# Patient Record
Sex: FEMALE | Race: WHITE | NOT HISPANIC OR LATINO | Employment: FULL TIME | URBAN - METROPOLITAN AREA
[De-identification: names, ages, dates, MRNs, and addresses within clinical notes are randomized per-mention and may not be internally consistent; named-entity substitution may affect disease eponyms.]

---

## 2021-08-23 ENCOUNTER — HISTORICAL (OUTPATIENT)
Dept: ADMINISTRATIVE | Facility: HOSPITAL | Age: 36
End: 2021-08-23

## 2021-08-23 LAB
ABS NEUT (OLG): 7.6 X10(3)/MCL (ref 2.1–9.2)
ALBUMIN SERPL-MCNC: 4.3 GM/DL (ref 3.4–5)
ALBUMIN/GLOB SERPL: 1.72 {RATIO} (ref 1.5–2.5)
ALP SERPL-CCNC: 60 UNIT/L (ref 38–126)
ALT SERPL-CCNC: 13 UNIT/L (ref 7–52)
AST SERPL-CCNC: 12 UNIT/L (ref 15–37)
BILIRUB SERPL-MCNC: 0.3 MG/DL (ref 0.2–1)
BILIRUBIN DIRECT+TOT PNL SERPL-MCNC: 0.1 MG/DL (ref 0–0.5)
BILIRUBIN DIRECT+TOT PNL SERPL-MCNC: 0.2 MG/DL
BUN SERPL-MCNC: 10 MG/DL (ref 7–18)
CALCIUM SERPL-MCNC: 9.2 MG/DL (ref 8.5–10)
CHLORIDE SERPL-SCNC: 106 MMOL/L (ref 98–107)
CHOLEST SERPL-MCNC: 165 MG/DL (ref 0–200)
CHOLEST/HDLC SERPL: 3.1 {RATIO}
CO2 SERPL-SCNC: 26 MMOL/L (ref 21–32)
CREAT SERPL-MCNC: 0.61 MG/DL (ref 0.6–1.3)
ERYTHROCYTE [DISTWIDTH] IN BLOOD BY AUTOMATED COUNT: 12.5 % (ref 11.5–17)
EST CREAT CLEARANCE SER (OHS): 172.03 ML/MIN
GLOBULIN SER-MCNC: 2.5 GM/DL (ref 1.2–3)
GLUCOSE SERPL-MCNC: 87 MG/DL (ref 74–106)
HCT VFR BLD AUTO: 42.5 % (ref 37–47)
HDLC SERPL-MCNC: 53 MG/DL (ref 35–60)
HGB BLD-MCNC: 13.9 GM/DL (ref 12–16)
LDLC SERPL CALC-MCNC: 81 MG/DL (ref 0–129)
LYMPHOCYTES # BLD AUTO: 1.9 X10(3)/MCL (ref 0.6–3.4)
LYMPHOCYTES NFR BLD AUTO: 19.6 % (ref 13–40)
MCH RBC QN AUTO: 29.4 PG (ref 27–31.2)
MCHC RBC AUTO-ENTMCNC: 33 GM/DL (ref 32–36)
MCV RBC AUTO: 90 FL (ref 80–94)
MONOCYTES # BLD AUTO: 0.4 X10(3)/MCL (ref 0.1–1.3)
MONOCYTES NFR BLD AUTO: 4.2 % (ref 0.1–24)
NEUTROPHILS NFR BLD AUTO: 76.2 % (ref 47–80)
PLATELET # BLD AUTO: 372 X10(3)/MCL (ref 130–400)
PMV BLD AUTO: 8.6 FL (ref 9.4–12.4)
POTASSIUM SERPL-SCNC: 4.2 MMOL/L (ref 3.5–5.1)
PROT SERPL-MCNC: 6.8 GM/DL (ref 6.4–8.2)
RBC # BLD AUTO: 4.73 X10(6)/MCL (ref 4.2–5.4)
SODIUM SERPL-SCNC: 140 MMOL/L (ref 136–145)
TRIGL SERPL-MCNC: 148 MG/DL (ref 30–150)
TSH SERPL-ACNC: 1.76 MIU/ML (ref 0.35–4.94)
VLDLC SERPL CALC-MCNC: 29.6 MG/DL
WBC # SPEC AUTO: 9.9 X10(3)/MCL (ref 4.5–11.5)

## 2022-04-07 ENCOUNTER — HISTORICAL (OUTPATIENT)
Dept: ADMINISTRATIVE | Facility: HOSPITAL | Age: 37
End: 2022-04-07

## 2022-04-23 VITALS
DIASTOLIC BLOOD PRESSURE: 88 MMHG | HEIGHT: 60 IN | BODY MASS INDEX: 37.35 KG/M2 | WEIGHT: 190.25 LBS | SYSTOLIC BLOOD PRESSURE: 130 MMHG | OXYGEN SATURATION: 97 %

## 2022-05-02 NOTE — HISTORICAL [facility]
This is a historical note converted from Paulo. Formatting and pictures may have been removed.  Please reference Paulo for original formatting and attached multimedia. Chief Complaint  CPX FASTING  History of Present Illness  36?year old female presents for wellness visit.  Blood Pressure -130/88  BMI - 37.35  Immunizations -? Last Tetanus greater than 10 years  Cervical Cancer Screening -?up to date, Sees Dr. Goetz  Diet - Low carb/low calorie diet  Exercise - Walking 3-4 times weekly 30 minutes  Allergic Rhinitis - Manageable with Zyrtec and Flonase.?  Review of Systems  Constitutional:?No weight loss, no fever, no fatigue, no chills, no night sweats,?no weakness  Eyes:?No blurred vision,?no redness,?no drainage,?no ocular?pain  HEENT:?No sore throat,?no ear pain, no sinus pressure, no nasal congestion, no rhinorrhea, no postnasal drip  Respiratory:?No cough, no wheezing, no sputum production, no shortness of breath  Cardiovascular:?No chest pain, no palpitations, no dyspnea on exertion,?no orthopnea  Gastrointestinal:?No nausea, no vomiting, no abdominal pain, no diarrhea,?no constipation, no melena,?no hematochezia  Genitourinary:?No dysuria, no hematuria, no frequency, no urgency, no incontinence, no discharge  Musculoskeletal:?No myalgias, no arthralgias, no weakness, no joint effusion, no edema  Integumentary:?No rashes, no hives, no itching, no lesions, no jaundice  Neurologic:?No headaches, no numbness, no tingling, no weakness, no dizziness  Psychiatric:?No anxiety, no irritability, no depression,?no suicidal ideations, no?homicidal ideations,?no delusions, no hallucinations  Endocrine:?No polyuria, no polydipsia, no polyphagia  Hematology:?No bruising, no lymphadenopathy,?no paleness  ?  Physical Exam  Vitals & Measurements  HR:?99(Peripheral)? BP:?130/88? SpO2:?97%?  HT:?152.00?cm? WT:?86.300?kg? BMI:?37.35?  General:?Well developed, Well-nourished, in No acute distress, A&O x 4  Eye:?PERRLA, EOMI,  Clear conjunctiva, Eyelids unremarkable  Ears:?Bilateral EAC clear?and?Bilateral TM clear  Nose:? Mucosa normal, No rhinorrhea, No lesions  O/P:??No?erythema,?No tonsillar hypertrophy,?No tonsillar exudates,?No cobblestoning  Neck:?Soft, Nontender, No thyromegaly, Full range of motion, No cervical lymphadenopathy, No JVD  Cardiovascular:?Regular rate and rhythm, No murmurs, No gallops, No rubs  Respiratory:?Clear to auscultation bilaterally, No wheezes, No rhonchi, No?crackles  Abdomen:? Soft, Nontender, No hepatosplenomegaly, Normal and equal bowel sounds, No masses, No rebound, No guarding  Musculoskeletal:? No tenderness, FROM, No joint abnormality, No clubbing, No cyanosis,No?edema  Neurologic:? No motor or sensory deficits, Reflexes 2+ throughout, CN II-XII grossly intact  Integumentary:?No rashes or skin lesions  ?  Assessment/Plan  1.?Wellness examination?Z00.00  ?Discussed the?importance of maintaining a balanced diet and regular exercise  CBC, CMP, FLP, TSH today  Ordered:  Preventative Health Care Est 18-39 years [id number] PC, Wellness examination, [facility] - [facility], 08/23/21 11:05:00 CDT  ?  2.?Encounter for immunization?Z23  ?Tdap today  ?  Referrals  Clinic Follow up, Order for future visit   Problem List/Past Medical History  Ongoing  Obesity(  Probable Diagnosis  )  Historical  No qualifying data  Procedure/Surgical History  Adenoidectomy, primary; younger than age 12 (1989)  Tonsillectomy (1989)   Medications  ethinyl estradiol-norgestimate 35 mcg-0.25 mg oral tablet, 1 tab(s), Oral, Daily  Flonase 50 mcg/inh nasal spray, Nasal, Daily  Zyrtec 10 mg oral tablet, 10 mg= 1 tab(s), Oral, Daily  Allergies  sulfa drugs  Social History  Abuse/Neglect  No, No, Yes, 08/23/2021  Alcohol  Current, 1-2 times per month, 08/23/2021  Employment/School  Employed, Work/School description:  - [employer] 08/23/2021  Exercise  Exercise duration: 30. Exercise frequency: 3-4  times/week. Exercise type: Walking., 08/23/2021  Substance Use  Never, 12/30/2020  Tobacco  Former smoker, quit more than 30 days ago, Cigarettes, No, 10 per day. 8 year(s). 18 Years (Age started). 26 Years (Age stopped)., 08/23/2021  Family History  Hypertension.: Mother, Father and Sister.  Osteoarthritis: Mother.  Suicide: Brother.  Sister: History is negative  Immunizations  Vaccine Date Status Comments   tetanus/diphtheria/pertussis, acel(Tdap) 08/23/2021 Given    COVID-19 mRNA, LNP-S, PF - Moderna 04/09/2021 Recorded    COVID-19 mRNA, LNP-S, PF - Moderna 03/05/2021 Recorded    influenza virus vaccine, inactivated 09/01/2020 Recorded    influenza virus vaccine, inactivated 09/16/2019 Recorded    tetanus/diphtheria/pertussis, acel(Tdap) - Not Given Vaccine Unavailable     influenza virus vaccine, inactivated 10/12/2018 Recorded    influenza virus vaccine, inactivated 09/22/2014 Recorded    influenza virus vaccine, inactivated 10/09/2009 Recorded    Health Maintenance  Health Maintenance  ???Pending?(in the next year)  ??? ??OverDue  ??? ? ? ?Cervical Cancer Screening due??07/15/13??and every 3??year(s)  ??? ? ? ?Alcohol Misuse Screening due??01/02/21??and every 1??year(s)  ??? ??Due?  ??? ? ? ?ADL Screening due??08/23/21??and every 1??year(s)  ??? ? ? ?Depression Screening due??08/23/21??Unknown Frequency  ??? ??Due In Future?  ??? ? ? ?Obesity Screening not due until??01/01/22??and every 1??year(s)  ???Satisfied?(in the past 1 year)  ??? ??Satisfied?  ??? ? ? ?Blood Pressure Screening on??08/23/21.??Satisfied by Jany Lockhart LPN  ??? ? ? ?Body Mass Index Check on??08/23/21.??Satisfied by Jany Lockhart LPN  ??? ? ? ?Influenza Vaccine on??09/01/20.??Satisfied by Jany Lockhart LPN  ??? ? ? ?Obesity Screening on??08/23/21.??Satisfied by Jany Lockhart LPN  ??? ? ? ?Tetanus Vaccine on??08/23/21.??Satisfied by Jany Lockhart LPN  ?

## 2022-06-12 ENCOUNTER — OFFICE VISIT (OUTPATIENT)
Dept: URGENT CARE | Facility: CLINIC | Age: 37
End: 2022-06-12
Payer: COMMERCIAL

## 2022-06-12 VITALS
HEIGHT: 60 IN | DIASTOLIC BLOOD PRESSURE: 86 MMHG | RESPIRATION RATE: 17 BRPM | HEART RATE: 91 BPM | BODY MASS INDEX: 36.12 KG/M2 | WEIGHT: 184 LBS | OXYGEN SATURATION: 100 % | SYSTOLIC BLOOD PRESSURE: 130 MMHG | TEMPERATURE: 98 F

## 2022-06-12 DIAGNOSIS — J01.90 ACUTE SINUSITIS, RECURRENCE NOT SPECIFIED, UNSPECIFIED LOCATION: Primary | ICD-10-CM

## 2022-06-12 PROCEDURE — 3008F PR BODY MASS INDEX (BMI) DOCUMENTED: ICD-10-PCS | Mod: CPTII,,, | Performed by: PHYSICIAN ASSISTANT

## 2022-06-12 PROCEDURE — 1160F PR REVIEW ALL MEDS BY PRESCRIBER/CLIN PHARMACIST DOCUMENTED: ICD-10-PCS | Mod: CPTII,,, | Performed by: PHYSICIAN ASSISTANT

## 2022-06-12 PROCEDURE — 1159F PR MEDICATION LIST DOCUMENTED IN MEDICAL RECORD: ICD-10-PCS | Mod: CPTII,,, | Performed by: PHYSICIAN ASSISTANT

## 2022-06-12 PROCEDURE — 3079F DIAST BP 80-89 MM HG: CPT | Mod: CPTII,,, | Performed by: PHYSICIAN ASSISTANT

## 2022-06-12 PROCEDURE — 96372 PR INJECTION,THERAP/PROPH/DIAG2ST, IM OR SUBCUT: ICD-10-PCS | Mod: ,,, | Performed by: PHYSICIAN ASSISTANT

## 2022-06-12 PROCEDURE — 96372 THER/PROPH/DIAG INJ SC/IM: CPT | Mod: ,,, | Performed by: PHYSICIAN ASSISTANT

## 2022-06-12 PROCEDURE — 3008F BODY MASS INDEX DOCD: CPT | Mod: CPTII,,, | Performed by: PHYSICIAN ASSISTANT

## 2022-06-12 PROCEDURE — 1160F RVW MEDS BY RX/DR IN RCRD: CPT | Mod: CPTII,,, | Performed by: PHYSICIAN ASSISTANT

## 2022-06-12 PROCEDURE — 99213 PR OFFICE/OUTPT VISIT, EST, LEVL III, 20-29 MIN: ICD-10-PCS | Mod: 25,,, | Performed by: PHYSICIAN ASSISTANT

## 2022-06-12 PROCEDURE — 3075F SYST BP GE 130 - 139MM HG: CPT | Mod: CPTII,,, | Performed by: PHYSICIAN ASSISTANT

## 2022-06-12 PROCEDURE — 1159F MED LIST DOCD IN RCRD: CPT | Mod: CPTII,,, | Performed by: PHYSICIAN ASSISTANT

## 2022-06-12 PROCEDURE — 99213 OFFICE O/P EST LOW 20 MIN: CPT | Mod: 25,,, | Performed by: PHYSICIAN ASSISTANT

## 2022-06-12 PROCEDURE — 3075F PR MOST RECENT SYSTOLIC BLOOD PRESS GE 130-139MM HG: ICD-10-PCS | Mod: CPTII,,, | Performed by: PHYSICIAN ASSISTANT

## 2022-06-12 PROCEDURE — 3079F PR MOST RECENT DIASTOLIC BLOOD PRESSURE 80-89 MM HG: ICD-10-PCS | Mod: CPTII,,, | Performed by: PHYSICIAN ASSISTANT

## 2022-06-12 RX ORDER — NORGESTIMATE AND ETHINYL ESTRADIOL 0.25-0.035
1 KIT ORAL DAILY
COMMUNITY
Start: 2022-06-06 | End: 2022-08-25

## 2022-06-12 RX ORDER — BETAMETHASONE SODIUM PHOSPHATE AND BETAMETHASONE ACETATE 3; 3 MG/ML; MG/ML
9 INJECTION, SUSPENSION INTRA-ARTICULAR; INTRALESIONAL; INTRAMUSCULAR; SOFT TISSUE
Status: COMPLETED | OUTPATIENT
Start: 2022-06-12 | End: 2022-06-12

## 2022-06-12 RX ORDER — FLUTICASONE PROPIONATE 50 MCG
1 SPRAY, SUSPENSION (ML) NASAL DAILY
COMMUNITY

## 2022-06-12 RX ORDER — AMOXICILLIN AND CLAVULANATE POTASSIUM 875; 125 MG/1; MG/1
1 TABLET, FILM COATED ORAL EVERY 12 HOURS
Qty: 20 TABLET | Refills: 0 | Status: SHIPPED | OUTPATIENT
Start: 2022-06-12 | End: 2022-06-22

## 2022-06-12 RX ADMIN — BETAMETHASONE SODIUM PHOSPHATE AND BETAMETHASONE ACETATE 9 MG: 3; 3 INJECTION, SUSPENSION INTRA-ARTICULAR; INTRALESIONAL; INTRAMUSCULAR; SOFT TISSUE at 08:06

## 2022-06-12 NOTE — PROGRESS NOTES
Subjective:       Patient ID: Mireya Summers is a 37 y.o. female.    Vitals:  height is 5' (1.524 m) and weight is 83.5 kg (184 lb). Her temperature is 98.1 °F (36.7 °C). Her blood pressure is 130/86 and her pulse is 91. Her respiration is 17 and oxygen saturation is 100%.     Chief Complaint: Sinus Problem    Sinus pressure and congestion x 4 days  Cough and runny nose since 6/1    Denies any fever neck stiffness rash shortness of breath or GI symptoms.  Patient is without relief from Flonase Allegra and Mucinex.    Sinus Problem      ROS    Objective:      Physical Exam   Constitutional: She is oriented to person, place, and time. She appears well-developed. She is cooperative.  Non-toxic appearance. She does not appear ill. No distress.   HENT:   Head: Normocephalic and atraumatic.   Ears:   Right Ear: Hearing, tympanic membrane, external ear and ear canal normal.   Left Ear: Hearing, tympanic membrane, external ear and ear canal normal.   Nose: Nose normal. No mucosal edema, rhinorrhea or nasal deformity. No epistaxis. Right sinus exhibits no maxillary sinus tenderness and no frontal sinus tenderness. Left sinus exhibits no maxillary sinus tenderness and no frontal sinus tenderness.   Mouth/Throat: Uvula is midline, oropharynx is clear and moist and mucous membranes are normal. No trismus in the jaw. Normal dentition. No uvula swelling. No oropharyngeal exudate, posterior oropharyngeal edema or posterior oropharyngeal erythema.   Eyes: Conjunctivae and lids are normal. No scleral icterus.   Neck: Trachea normal and phonation normal. Neck supple. No edema present. No erythema present. No neck rigidity present.   Cardiovascular: Normal rate, regular rhythm, normal heart sounds and normal pulses.   Pulmonary/Chest: Effort normal and breath sounds normal. No respiratory distress. She has no decreased breath sounds. She has no rhonchi.   Abdominal: Normal appearance.   Musculoskeletal: Normal range of motion.          General: No deformity. Normal range of motion.   Neurological: She is alert and oriented to person, place, and time. She exhibits normal muscle tone. Coordination normal.   Skin: Skin is warm, dry, intact, not diaphoretic and not pale.   Psychiatric: Her speech is normal and behavior is normal. Judgment and thought content normal.   Nursing note and vitals reviewed.         Previous History      Review of patient's allergies indicates:   Allergen Reactions    Sulfa (sulfonamide antibiotics)        Past Medical History:   Diagnosis Date    Known health problems: none      Current Outpatient Medications   Medication Instructions    amoxicillin-clavulanate 875-125mg (AUGMENTIN) 875-125 mg per tablet 1 tablet, Oral, Every 12 hours    ESTARYLLA 0.25-35 mg-mcg per tablet 1 tablet, Oral, Daily    fluticasone propionate (FLONASE) 50 mcg/actuation nasal spray 1 spray, Each Nostril, Daily     Past Surgical History:   Procedure Laterality Date    ADENOIDECTOMY      TONSILLECTOMY       Family History   Problem Relation Age of Onset    No Known Problems Mother     No Known Problems Father        Social History     Tobacco Use    Smoking status: Never Smoker    Smokeless tobacco: Never Used   Substance Use Topics    Alcohol use: Not Currently    Drug use: Never        Physical Exam      Vital Signs Reviewed   /86   Pulse 91   Temp 98.1 °F (36.7 °C)   Resp 17   Ht 5' (1.524 m)   Wt 83.5 kg (184 lb)   LMP 06/01/2022   SpO2 100%   BMI 35.94 kg/m²        Procedures    Procedures     Labs     Results for orders placed or performed in visit on 08/23/21   Comprehensive Metabolic Panel   Result Value Ref Range    Alanine Aminotransferase 13.0 7.0 - 52.0 unit/L    Albumin/Globulin Ratio 1.72 1.50 - 2.50    Aspartate Aminotransferase 12.0 (L) 15.0 - 37.0 unit/L    Alkaline Phosphatase 60.0 38.0 - 126.0 unit/L    Albumin Level 4.3 3.4 - 5.0 gm/dL    Bilirubin Indirect 0.2 mg/dL    Blood Urea Nitrogen 10.0 7.0 -  18.0 mg/dL    Bilirubin Total 0.3 0.2 - 1.0 mg/dL    Bilirubin Direct 0.1 0.0 - 0.5 mg/dL    Calcium Level Total 9.2 8.5 - 10.0 mg/dL    Carbon Dioxide 26.0 21.0 - 32.0 mmol/L    Chloride 106.0 98.0 - 107.0 mmol/L    Creatinine 0.61 0.60 - 1.30 mg/dL    Glucose Level 87.0 74.0 - 106.0 mg/dL    Globulin 2.5 1.2 - 3.0 gm/dL    Sodium Level 140.0 136.0 - 145.0 mmol/L    Potassium Level 4.2 3.5 - 5.1 mmol/L    Protein Total 6.8 6.4 - 8.2 gm/dL   Lipid Panel   Result Value Ref Range    Cholesterol/HDL Ratio 3.1     Cholesterol Total 165.0 0.0 - 200.0 mg/dL    HDL Cholesterol 53.0 35.0 - 60.0 mg/dL    LDL Cholesterol 81.0 0.0 - 129.0 mg/dL    Triglyceride 148.0 30.0 - 150.0 mg/dL    Very Low Density Lipoprotein 29.6    TSH   Result Value Ref Range    Thyroid Stimulating Hormone 1.763 0.350 - 4.940 mIU/mL   GFR, Estimated   Result Value Ref Range    Estimated GFR-African American >60 mL/min/1.73 m2    Estimated GFR-Non African American >60 mL/min/1.73 m2   Differential   Result Value Ref Range    Mono # 0.4 0.1 - 1.3 x10(3)/mcL    Lymph # 1.9 0.6 - 3.4 x10(3)/mcL    Mono % 4.2 0.1 - 24.0 %    Neut % 76.2 47.0 - 80.0 %    Lymph % 19.6 13.0 - 40.0 %   CBC Auto Differential   Result Value Ref Range    Abs Neut 7.6 2.1 - 9.2 x10(3)/mcL    MCV 90 80 - 94 fL    MCH 29.4 27.0 - 31.2 pg    MCHC 33 32 - 36 gm/dL    MPV 8.6 (L) 9.4 - 12.4 fL    Hgb 13.9 12.0 - 16.0 gm/dL    Hct 42.5 37.0 - 47.0 %    Platelet 372 130 - 400 x10(3)/mcL    WBC 9.9 4.5 - 11.5 x10(3)/mcL    RDW 12.5 11.5 - 17.0 %    RBC 4.73 4.20 - 5.40 x10(6)/mcL         Assessment:       1. Acute sinusitis, recurrence not specified, unspecified location          Plan:         Acute sinusitis, recurrence not specified, unspecified location  -     betamethasone acetate-betamethasone sodium phosphate injection 9 mg  -     amoxicillin-clavulanate 875-125mg (AUGMENTIN) 875-125 mg per tablet; Take 1 tablet by mouth every 12 (twelve) hours. for 10 days  Dispense: 20 tablet;  Refill: 0    Drink plenty of fluids    Get plenty of rest.    Follow-up with your primary care doctor      Go to emergency department with any significant change or worsening symptoms.    Tylenol or Motrin as needed for fever.

## 2022-08-25 PROBLEM — J30.9 ALLERGIC RHINITIS: Status: ACTIVE | Noted: 2022-08-25

## 2022-08-25 PROBLEM — E66.9 OBESITY: Status: ACTIVE | Noted: 2022-08-25

## 2022-08-25 PROBLEM — J01.90 ACUTE SINUSITIS: Status: RESOLVED | Noted: 2022-06-12 | Resolved: 2022-08-25

## 2023-02-03 ENCOUNTER — OFFICE VISIT (OUTPATIENT)
Dept: URGENT CARE | Facility: CLINIC | Age: 38
End: 2023-02-03
Payer: COMMERCIAL

## 2023-02-03 VITALS
OXYGEN SATURATION: 100 % | SYSTOLIC BLOOD PRESSURE: 134 MMHG | BODY MASS INDEX: 35.34 KG/M2 | TEMPERATURE: 98 F | HEART RATE: 107 BPM | RESPIRATION RATE: 17 BRPM | DIASTOLIC BLOOD PRESSURE: 82 MMHG | HEIGHT: 60 IN | WEIGHT: 180 LBS

## 2023-02-03 DIAGNOSIS — J02.9 SORE THROAT: ICD-10-CM

## 2023-02-03 DIAGNOSIS — R05.9 COUGH, UNSPECIFIED TYPE: ICD-10-CM

## 2023-02-03 DIAGNOSIS — J02.0 STREP PHARYNGITIS: Primary | ICD-10-CM

## 2023-02-03 LAB
CTP QC/QA: YES
MOLECULAR STREP A: POSITIVE
POC MOLECULAR INFLUENZA A AGN: NEGATIVE
POC MOLECULAR INFLUENZA B AGN: NEGATIVE
SARS-COV-2 RDRP RESP QL NAA+PROBE: NEGATIVE

## 2023-02-03 PROCEDURE — 87635: ICD-10-PCS | Mod: QW,,,

## 2023-02-03 PROCEDURE — 87635 SARS-COV-2 COVID-19 AMP PRB: CPT | Mod: QW,,,

## 2023-02-03 PROCEDURE — 99214 PR OFFICE/OUTPT VISIT, EST, LEVL IV, 30-39 MIN: ICD-10-PCS | Mod: ,,,

## 2023-02-03 PROCEDURE — 87651 STREP A DNA AMP PROBE: CPT | Mod: QW,,,

## 2023-02-03 PROCEDURE — 3008F PR BODY MASS INDEX (BMI) DOCUMENTED: ICD-10-PCS | Mod: CPTII,,,

## 2023-02-03 PROCEDURE — 1160F PR REVIEW ALL MEDS BY PRESCRIBER/CLIN PHARMACIST DOCUMENTED: ICD-10-PCS | Mod: CPTII,,,

## 2023-02-03 PROCEDURE — 1160F RVW MEDS BY RX/DR IN RCRD: CPT | Mod: CPTII,,,

## 2023-02-03 PROCEDURE — 1159F MED LIST DOCD IN RCRD: CPT | Mod: CPTII,,,

## 2023-02-03 PROCEDURE — 87651 POCT STREP A MOLECULAR: ICD-10-PCS | Mod: QW,,,

## 2023-02-03 PROCEDURE — 3008F BODY MASS INDEX DOCD: CPT | Mod: CPTII,,,

## 2023-02-03 PROCEDURE — 87502 POCT INFLUENZA A/B MOLECULAR: ICD-10-PCS | Mod: QW,,,

## 2023-02-03 PROCEDURE — 87502 INFLUENZA DNA AMP PROBE: CPT | Mod: QW,,,

## 2023-02-03 PROCEDURE — 3079F DIAST BP 80-89 MM HG: CPT | Mod: CPTII,,,

## 2023-02-03 PROCEDURE — 3075F PR MOST RECENT SYSTOLIC BLOOD PRESS GE 130-139MM HG: ICD-10-PCS | Mod: CPTII,,,

## 2023-02-03 PROCEDURE — 3075F SYST BP GE 130 - 139MM HG: CPT | Mod: CPTII,,,

## 2023-02-03 PROCEDURE — 1159F PR MEDICATION LIST DOCUMENTED IN MEDICAL RECORD: ICD-10-PCS | Mod: CPTII,,,

## 2023-02-03 PROCEDURE — 99214 OFFICE O/P EST MOD 30 MIN: CPT | Mod: ,,,

## 2023-02-03 PROCEDURE — 3079F PR MOST RECENT DIASTOLIC BLOOD PRESSURE 80-89 MM HG: ICD-10-PCS | Mod: CPTII,,,

## 2023-02-03 RX ORDER — PROMETHAZINE HYDROCHLORIDE AND DEXTROMETHORPHAN HYDROBROMIDE 6.25; 15 MG/5ML; MG/5ML
5 SYRUP ORAL EVERY 6 HOURS PRN
Qty: 180 ML | Refills: 0 | Status: SHIPPED | OUTPATIENT
Start: 2023-02-03 | End: 2023-02-13

## 2023-02-03 RX ORDER — AMOXICILLIN 500 MG/1
500 TABLET, FILM COATED ORAL EVERY 12 HOURS
Qty: 20 TABLET | Refills: 0 | Status: SHIPPED | OUTPATIENT
Start: 2023-02-03 | End: 2023-02-13

## 2023-02-03 NOTE — PROGRESS NOTES
Subjective:       Patient ID: Mireya Summers is a 37 y.o. female.    Vitals:  height is 5' (1.524 m) and weight is 81.6 kg (180 lb). Her temperature is 98 °F (36.7 °C). Her blood pressure is 134/82 and her pulse is 107. Her respiration is 17 and oxygen saturation is 100%.     Chief Complaint: Sore Throat (Sore throat, headache, sinus drip, congestion began yesterday. Advil and Mucinex taken at 7am)    Patient is a 37-year-old female that presents complaining of sore throat, sinus congestion, sinus pressure headache, postnasal drip that is causing nausea times 2-3 days.  States she has taken Advil Mucinex with no improvement. Patient denies any SOB, CP, rash, n/v/d, or neck stiffness.      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    Sore Throat   Associated symptoms include congestion.     HENT:  Positive for congestion, postnasal drip, sinus pressure and sore throat.      Objective:      Physical Exam   Constitutional: She is oriented to person, place, and time.  Non-toxic appearance. She does not appear ill.   HENT:   Ears:   Right Ear: Tympanic membrane, external ear and ear canal normal.   Left Ear: Tympanic membrane, external ear and ear canal normal.   Nose: Rhinorrhea present. Right sinus exhibits maxillary sinus tenderness. Left sinus exhibits maxillary sinus tenderness.   Mouth/Throat: Posterior oropharyngeal erythema present. Tonsils are 3+ on the right. Tonsils are 3+ on the left. No tonsillar exudate.   Clear PND noted      Comments: Clear PND noted  Pulmonary/Chest: Effort normal and breath sounds normal.   Abdominal: Normal appearance and bowel sounds are normal. Soft. There is no abdominal tenderness.   Musculoskeletal: Normal range of motion.         General: Normal range of motion.   Neurological: She is alert and oriented to person, place, and time.   Skin: Skin is warm and dry. Capillary refill takes less than 2 seconds.   Psychiatric: Her behavior is normal. Mood  normal.       Assessment:       1. Strep pharyngitis    2. Sore throat    3. Cough, unspecified type             Previous History      Review of patient's allergies indicates:   Allergen Reactions    Sulfa (sulfonamide antibiotics)        Past Medical History:   Diagnosis Date    Allergic rhinitis 2022    Known health problems: none      Current Outpatient Medications   Medication Instructions    amoxicillin (AMOXIL) 500 mg, Oral, Every 12 hours    cetirizine (ZYRTEC) 10 mg, Oral, Daily    fluticasone propionate (FLONASE) 50 mcg/actuation nasal spray 1 spray, Each Nostril, Daily    norgestimate-ethinyl estradioL (ORTHO-CYCLEN) 0.25-35 mg-mcg per tablet Oral    promethazine-dextromethorphan (PROMETHAZINE-DM) 6.25-15 mg/5 mL Syrp 5 mLs, Oral, Every 6 hours PRN     Past Surgical History:   Procedure Laterality Date    ADENOIDECTOMY      TONSILLECTOMY       Family History   Problem Relation Age of Onset    Hypertension Mother         High blood pressure controlled with meds    Osteoarthritis Mother     Osteoporosis Mother     Hypertension Father         High blood pressure controlled with meds    Hypertension Sister     No Known Problems Sister     Suicide Brother     Lung cancer Maternal Grandmother         Lung cancer  at age 84    Stomach cancer Maternal Grandfather         Stomach Cancer  at age 78       Social History     Tobacco Use    Smoking status: Former     Packs/day: 0.50     Years: 5.00     Pack years: 2.50     Types: Cigarettes     Start date: 2003     Quit date: 1/3/2011     Years since quittin.0    Smokeless tobacco: Never   Substance Use Topics    Alcohol use: Not Currently     Comment: Only drink socially two/three times a year    Drug use: Never        Physical Exam      Vital Signs Reviewed   /82   Pulse 107   Temp 98 °F (36.7 °C)   Resp 17   Ht 5' (1.524 m)   Wt 81.6 kg (180 lb)   LMP 2023   SpO2 100%   BMI 35.15 kg/m²        Procedures    Procedures      Labs     Results for orders placed or performed in visit on 02/03/23   POCT Influenza A/B Molecular   Result Value Ref Range    POC Molecular Influenza A Ag Negative Negative, Not Reported    POC Molecular Influenza B Ag Negative Negative, Not Reported     Acceptable Yes    POCT Strep A, Molecular   Result Value Ref Range    Molecular Strep A, POC Positive (A) Negative     Acceptable Yes    POCT COVID-19 Rapid Screening   Result Value Ref Range    POC Rapid COVID Negative Negative     Acceptable Yes       Plan:         Strep pharyngitis  -     amoxicillin (AMOXIL) 500 MG Tab; Take 1 tablet (500 mg total) by mouth every 12 (twelve) hours. for 10 days  Dispense: 20 tablet; Refill: 0    Sore throat  -     POCT Influenza A/B Molecular  -     POCT Strep A, Molecular  -     POCT COVID-19 Rapid Screening    Cough, unspecified type  -     promethazine-dextromethorphan (PROMETHAZINE-DM) 6.25-15 mg/5 mL Syrp; Take 5 mLs by mouth every 6 (six) hours as needed (cough).  Dispense: 180 mL; Refill: 0

## 2023-08-30 PROBLEM — L74.510 AXILLARY HYPERHIDROSIS: Status: ACTIVE | Noted: 2023-08-30

## 2023-09-01 ENCOUNTER — OFFICE VISIT (OUTPATIENT)
Dept: URGENT CARE | Facility: CLINIC | Age: 38
End: 2023-09-01
Payer: COMMERCIAL

## 2023-09-01 VITALS
DIASTOLIC BLOOD PRESSURE: 85 MMHG | TEMPERATURE: 99 F | WEIGHT: 184 LBS | BODY MASS INDEX: 36.12 KG/M2 | RESPIRATION RATE: 18 BRPM | HEIGHT: 60 IN | HEART RATE: 101 BPM | SYSTOLIC BLOOD PRESSURE: 130 MMHG | OXYGEN SATURATION: 97 %

## 2023-09-01 DIAGNOSIS — J02.9 ACUTE PHARYNGITIS, UNSPECIFIED ETIOLOGY: Primary | ICD-10-CM

## 2023-09-01 DIAGNOSIS — J02.9 SORE THROAT: ICD-10-CM

## 2023-09-01 LAB
CTP QC/QA: YES
CTP QC/QA: YES
MOLECULAR STREP A: NEGATIVE
SARS-COV-2 RDRP RESP QL NAA+PROBE: NEGATIVE

## 2023-09-01 PROCEDURE — 96372 THER/PROPH/DIAG INJ SC/IM: CPT | Mod: ,,, | Performed by: PHYSICIAN ASSISTANT

## 2023-09-01 PROCEDURE — 99213 OFFICE O/P EST LOW 20 MIN: CPT | Mod: 25,,, | Performed by: PHYSICIAN ASSISTANT

## 2023-09-01 PROCEDURE — 87635: ICD-10-PCS | Mod: QW,,, | Performed by: PHYSICIAN ASSISTANT

## 2023-09-01 PROCEDURE — 87651 POCT STREP A MOLECULAR: ICD-10-PCS | Mod: QW,,, | Performed by: PHYSICIAN ASSISTANT

## 2023-09-01 PROCEDURE — 99213 PR OFFICE/OUTPT VISIT, EST, LEVL III, 20-29 MIN: ICD-10-PCS | Mod: 25,,, | Performed by: PHYSICIAN ASSISTANT

## 2023-09-01 PROCEDURE — 87651 STREP A DNA AMP PROBE: CPT | Mod: QW,,, | Performed by: PHYSICIAN ASSISTANT

## 2023-09-01 PROCEDURE — 87635 SARS-COV-2 COVID-19 AMP PRB: CPT | Mod: QW,,, | Performed by: PHYSICIAN ASSISTANT

## 2023-09-01 PROCEDURE — 96372 PR INJECTION,THERAP/PROPH/DIAG2ST, IM OR SUBCUT: ICD-10-PCS | Mod: ,,, | Performed by: PHYSICIAN ASSISTANT

## 2023-09-01 RX ORDER — BETAMETHASONE SODIUM PHOSPHATE AND BETAMETHASONE ACETATE 3; 3 MG/ML; MG/ML
9 INJECTION, SUSPENSION INTRA-ARTICULAR; INTRALESIONAL; INTRAMUSCULAR; SOFT TISSUE
Status: COMPLETED | OUTPATIENT
Start: 2023-09-01 | End: 2023-09-01

## 2023-09-01 RX ADMIN — BETAMETHASONE SODIUM PHOSPHATE AND BETAMETHASONE ACETATE 9 MG: 3; 3 INJECTION, SUSPENSION INTRA-ARTICULAR; INTRALESIONAL; INTRAMUSCULAR; SOFT TISSUE at 06:09

## 2023-09-01 NOTE — PROGRESS NOTES
Subjective:      Patient ID: Mireya Summers is a 38 y.o. female.    Vitals:  height is 5' (1.524 m) and weight is 83.5 kg (184 lb). Her temperature is 98.5 °F (36.9 °C). Her blood pressure is 130/85 and her pulse is 101. Her respiration is 18 and oxygen saturation is 97%.     Chief Complaint: Sore Throat     Patient is a 38 y.o. female who presents to urgent care with complaints of  sore throat and headache  x3 days.  Denies any fever shortness of breath or GI symptoms.    ROS   Objective:     Physical Exam   Constitutional: She is oriented to person, place, and time. She appears well-developed. She is cooperative.  Non-toxic appearance. She does not appear ill. No distress.   HENT:   Head: Normocephalic and atraumatic.   Ears:   Right Ear: Hearing, tympanic membrane, external ear and ear canal normal.   Left Ear: Hearing, tympanic membrane, external ear and ear canal normal.   Nose: Nose normal. No nasal deformity. No epistaxis.   Mouth/Throat: Uvula is midline, oropharynx is clear and moist and mucous membranes are normal. No trismus in the jaw. Normal dentition. No uvula swelling. No oropharyngeal exudate, posterior oropharyngeal edema or posterior oropharyngeal erythema.   Eyes: Conjunctivae and lids are normal. No scleral icterus.   Neck: Trachea normal and phonation normal. Neck supple. No edema present. No erythema present. No neck rigidity present.   Cardiovascular: Normal rate, regular rhythm, normal heart sounds and normal pulses.   Pulmonary/Chest: Effort normal and breath sounds normal. No respiratory distress. She has no decreased breath sounds. She has no rhonchi.   Abdominal: Normal appearance.   Musculoskeletal: Normal range of motion.         General: No deformity. Normal range of motion.   Lymphadenopathy:     She has no cervical adenopathy.   Neurological: She is alert and oriented to person, place, and time. She exhibits normal muscle tone. Coordination normal.   Skin: Skin is warm, dry, intact,  not diaphoretic and not pale.   Psychiatric: Her speech is normal and behavior is normal. Judgment and thought content normal.   Nursing note and vitals reviewed.         Previous History      Review of patient's allergies indicates:   Allergen Reactions    Sulfa (sulfonamide antibiotics)        Past Medical History:   Diagnosis Date    Allergic rhinitis 2022    Known health problems: none      Current Outpatient Medications   Medication Instructions    aluminum chloride (DRYSOL) 20 % external solution Topical (Top), Nightly    fluticasone propionate (FLONASE) 50 mcg/actuation nasal spray 1 spray, Each Nostril, Daily    loratadine (CLARITIN) 10 mg, Oral, Daily    norgestimate-ethinyl estradioL (ORTHO-CYCLEN) 0.25-35 mg-mcg per tablet Oral     Past Surgical History:   Procedure Laterality Date    ADENOIDECTOMY N/A     TONSILLECTOMY N/A      Family History   Problem Relation Age of Onset    Hypertension Mother         High blood pressure controlled with meds    Osteoarthritis Mother     Osteoporosis Mother     Hypertension Father         High blood pressure controlled with meds    Hypertension Sister     No Known Problems Sister     Suicide Brother     Lung cancer Maternal Grandmother         Lung cancer  at age 84    Stomach cancer Maternal Grandfather         Stomach Cancer  at age 78       Social History     Tobacco Use    Smoking status: Former     Current packs/day: 0.00     Average packs/day: 0.5 packs/day for 7.5 years (3.8 ttl pk-yrs)     Types: Cigarettes     Start date: 2003     Quit date: 1/3/2011     Years since quittin.6    Smokeless tobacco: Never   Substance Use Topics    Alcohol use: Not Currently     Comment: Only drink socially two/three times a year    Drug use: Never        Physical Exam      Vital Signs Reviewed   /85   Pulse 101   Temp 98.5 °F (36.9 °C)   Resp 18   Ht 5' (1.524 m)   Wt 83.5 kg (184 lb)   LMP 2023   SpO2 97%   BMI 35.94 kg/m²         Procedures    Procedures     Labs     Results for orders placed or performed in visit on 09/01/23   POCT COVID-19 Rapid Screening   Result Value Ref Range    POC Rapid COVID Negative Negative     Acceptable Yes    POCT Strep A, Molecular   Result Value Ref Range    Molecular Strep A, POC Negative Negative     Acceptable Yes      Assessment:     1. Acute pharyngitis, unspecified etiology    2. Sore throat        Plan:       Acute pharyngitis, unspecified etiology    Sore throat  -     POCT COVID-19 Rapid Screening  -     POCT Strep A, Molecular    Other orders  -     betamethasone acetate-betamethasone sodium phosphate injection 9 mg    Drink plenty of fluids.     Get plenty of rest.     Tylenol or Motrin as needed.     Go to the ER with any significant change or worsening of symptoms.     Follow up with your primary care doctor.

## 2024-03-12 ENCOUNTER — OFFICE VISIT (OUTPATIENT)
Dept: URGENT CARE | Facility: CLINIC | Age: 39
End: 2024-03-12
Payer: COMMERCIAL

## 2024-03-12 VITALS
BODY MASS INDEX: 34.16 KG/M2 | HEIGHT: 60 IN | SYSTOLIC BLOOD PRESSURE: 135 MMHG | RESPIRATION RATE: 18 BRPM | TEMPERATURE: 98 F | OXYGEN SATURATION: 99 % | HEART RATE: 82 BPM | WEIGHT: 174 LBS | DIASTOLIC BLOOD PRESSURE: 92 MMHG

## 2024-03-12 DIAGNOSIS — J02.9 ACUTE PHARYNGITIS, UNSPECIFIED ETIOLOGY: Primary | ICD-10-CM

## 2024-03-12 DIAGNOSIS — J02.9 SORE THROAT: ICD-10-CM

## 2024-03-12 LAB
CTP QC/QA: YES
MOLECULAR STREP A: NEGATIVE

## 2024-03-12 PROCEDURE — 96372 THER/PROPH/DIAG INJ SC/IM: CPT | Mod: ,,, | Performed by: PHYSICIAN ASSISTANT

## 2024-03-12 PROCEDURE — 87651 STREP A DNA AMP PROBE: CPT | Mod: QW,,, | Performed by: PHYSICIAN ASSISTANT

## 2024-03-12 PROCEDURE — 99213 OFFICE O/P EST LOW 20 MIN: CPT | Mod: 25,,, | Performed by: PHYSICIAN ASSISTANT

## 2024-03-12 RX ORDER — BETAMETHASONE SODIUM PHOSPHATE AND BETAMETHASONE ACETATE 3; 3 MG/ML; MG/ML
9 INJECTION, SUSPENSION INTRA-ARTICULAR; INTRALESIONAL; INTRAMUSCULAR; SOFT TISSUE
Status: COMPLETED | OUTPATIENT
Start: 2024-03-12 | End: 2024-03-12

## 2024-03-12 RX ADMIN — BETAMETHASONE SODIUM PHOSPHATE AND BETAMETHASONE ACETATE 9 MG: 3; 3 INJECTION, SUSPENSION INTRA-ARTICULAR; INTRALESIONAL; INTRAMUSCULAR; SOFT TISSUE at 12:03

## 2024-03-12 NOTE — PROGRESS NOTES
Subjective:      Patient ID: Mireya Summers is a 39 y.o. female.    Vitals:  height is 5' (1.524 m) and weight is 78.9 kg (174 lb). Her temperature is 97.9 °F (36.6 °C). Her blood pressure is 135/92 (abnormal) and her pulse is 82. Her respiration is 18 and oxygen saturation is 99%.     Chief Complaint: Sore Throat        Patient is a 39-year-old female complains of a 1 day history of fatigue malaise nausea and sore throat.  Denies fever shortness of breath cough or GI symptoms.      ROS   Objective:     Physical Exam   Constitutional: She is oriented to person, place, and time. She appears well-developed. She is cooperative.  Non-toxic appearance. She does not appear ill. No distress.   HENT:   Head: Normocephalic and atraumatic.   Ears:   Right Ear: Hearing, tympanic membrane, external ear and ear canal normal.   Left Ear: Hearing, tympanic membrane, external ear and ear canal normal.   Nose: Nose normal. No nasal deformity. No epistaxis.   Mouth/Throat: Uvula is midline, oropharynx is clear and moist and mucous membranes are normal. No trismus in the jaw. Normal dentition. No uvula swelling. No oropharyngeal exudate, posterior oropharyngeal edema or posterior oropharyngeal erythema.   Eyes: Conjunctivae and lids are normal. No scleral icterus.   Neck: Trachea normal and phonation normal. Neck supple. No edema present. No erythema present. No neck rigidity present.   Cardiovascular: Normal rate, regular rhythm, normal heart sounds and normal pulses.   Pulmonary/Chest: Effort normal and breath sounds normal. No respiratory distress. She has no decreased breath sounds. She has no rhonchi.   Abdominal: Normal appearance.   Musculoskeletal: Normal range of motion.         General: No deformity. Normal range of motion.   Lymphadenopathy:     She has cervical adenopathy.   Neurological: She is alert and oriented to person, place, and time. She exhibits normal muscle tone. Coordination normal.   Skin: Skin is warm, dry,  intact, not diaphoretic and not pale.   Psychiatric: Her speech is normal and behavior is normal. Judgment and thought content normal.   Nursing note and vitals reviewed.    Strep negative       Previous History      Review of patient's allergies indicates:   Allergen Reactions    Sulfa (sulfonamide antibiotics)        Past Medical History:   Diagnosis Date    Allergic rhinitis 2022    Known health problems: none      Current Outpatient Medications   Medication Instructions    aluminum chloride (DRYSOL) 20 % external solution Topical (Top), Nightly    fluticasone propionate (FLONASE) 50 mcg/actuation nasal spray 1 spray, Each Nostril, Daily    loratadine (CLARITIN) 10 mg, Oral, Daily    norgestimate-ethinyl estradioL (ORTHO-CYCLEN) 0.25-35 mg-mcg per tablet Oral     Past Surgical History:   Procedure Laterality Date    ADENOIDECTOMY N/A     TONSILLECTOMY N/A      Family History   Problem Relation Age of Onset    Hypertension Mother         High blood pressure controlled with meds    Osteoarthritis Mother     Osteoporosis Mother     Hypertension Father         High blood pressure controlled with meds    Hypertension Sister     No Known Problems Sister     Suicide Brother     Lung cancer Maternal Grandmother         Lung cancer  at age 84    Stomach cancer Maternal Grandfather         Stomach Cancer  at age 78       Social History     Tobacco Use    Smoking status: Former     Current packs/day: 0.00     Average packs/day: 0.5 packs/day for 7.5 years (3.8 ttl pk-yrs)     Types: Cigarettes     Start date: 2003     Quit date: 1/3/2011     Years since quittin.1    Smokeless tobacco: Never   Substance Use Topics    Alcohol use: Not Currently     Comment: Only drink socially two/three times a year    Drug use: Never        Physical Exam      Vital Signs Reviewed   BP (!) 135/92   Pulse 82   Temp 97.9 °F (36.6 °C)   Resp 18   Ht 5' (1.524 m)   Wt 78.9 kg (174 lb)   SpO2 99%   BMI 33.98  kg/m²        Procedures    Procedures     Labs     Results for orders placed or performed in visit on 03/12/24   POCT Strep A, Molecular   Result Value Ref Range    Molecular Strep A, POC Negative Negative     Acceptable Yes        Assessment:     1. Acute pharyngitis, unspecified etiology    2. Sore throat        Plan:       Acute pharyngitis, unspecified etiology    Sore throat  -     POCT Strep A, Molecular    Other orders  -     betamethasone acetate-betamethasone sodium phosphate injection 9 mg    Drink plenty of fluids.     Get plenty of rest.     Tylenol or Motrin as needed.     Go to the ER with any significant change or worsening of symptoms.     Follow up with your primary care doctor.